# Patient Record
Sex: FEMALE | Race: WHITE | NOT HISPANIC OR LATINO | URBAN - METROPOLITAN AREA
[De-identification: names, ages, dates, MRNs, and addresses within clinical notes are randomized per-mention and may not be internally consistent; named-entity substitution may affect disease eponyms.]

---

## 2017-01-01 ENCOUNTER — COMMUNICATION - HEALTHEAST (OUTPATIENT)
Dept: INTERNAL MEDICINE | Facility: CLINIC | Age: 82
End: 2017-01-01

## 2017-01-01 ENCOUNTER — RECORDS - HEALTHEAST (OUTPATIENT)
Dept: ADMINISTRATIVE | Facility: OTHER | Age: 82
End: 2017-01-01

## 2017-01-01 ENCOUNTER — OFFICE VISIT - HEALTHEAST (OUTPATIENT)
Dept: INTERNAL MEDICINE | Facility: CLINIC | Age: 82
End: 2017-01-01

## 2017-01-01 ENCOUNTER — COMMUNICATION - HEALTHEAST (OUTPATIENT)
Dept: SCHEDULING | Facility: CLINIC | Age: 82
End: 2017-01-01

## 2017-01-01 ENCOUNTER — OFFICE VISIT - HEALTHEAST (OUTPATIENT)
Dept: FAMILY MEDICINE | Facility: CLINIC | Age: 82
End: 2017-01-01

## 2017-01-01 DIAGNOSIS — K58.9 IRRITABLE BOWEL SYNDROME WITHOUT DIARRHEA: ICD-10-CM

## 2017-01-01 DIAGNOSIS — R00.2 PALPITATION: ICD-10-CM

## 2017-01-01 DIAGNOSIS — I10 ESSENTIAL HYPERTENSION: ICD-10-CM

## 2017-01-01 DIAGNOSIS — B07.8 COMMON WART: ICD-10-CM

## 2017-01-01 DIAGNOSIS — G30.1 LATE ONSET ALZHEIMER'S DISEASE WITHOUT BEHAVIORAL DISTURBANCE (H): ICD-10-CM

## 2017-01-01 DIAGNOSIS — F02.80 LATE ONSET ALZHEIMER'S DISEASE WITHOUT BEHAVIORAL DISTURBANCE (H): ICD-10-CM

## 2017-01-01 DIAGNOSIS — B37.89 CANDIDA RASH OF GROIN: ICD-10-CM

## 2017-01-01 DIAGNOSIS — M19.90 OSTEOARTHRITIS: ICD-10-CM

## 2017-01-01 DIAGNOSIS — J06.9 URI (UPPER RESPIRATORY INFECTION): ICD-10-CM

## 2017-01-01 RX ORDER — GUAIFENESIN 600 MG/1
1200 TABLET, EXTENDED RELEASE ORAL 2 TIMES DAILY
Qty: 60 TABLET | Refills: 3 | Status: SHIPPED | OUTPATIENT
Start: 2017-01-01

## 2017-01-01 RX ORDER — ACETAMINOPHEN 500 MG
1000 TABLET ORAL 3 TIMES DAILY
Qty: 60 TABLET | Refills: 4 | Status: SHIPPED | COMMUNITY
Start: 2017-01-01

## 2017-01-01 RX ORDER — NYSTATIN 100000 U/G
CREAM TOPICAL
Qty: 30 G | Refills: 1 | Status: SHIPPED | OUTPATIENT
Start: 2017-01-01

## 2017-01-01 ASSESSMENT — MIFFLIN-ST. JEOR: SCORE: 1043.6

## 2017-01-18 ENCOUNTER — COMMUNICATION - HEALTHEAST (OUTPATIENT)
Dept: INTERNAL MEDICINE | Facility: CLINIC | Age: 82
End: 2017-01-18

## 2017-01-18 ENCOUNTER — RECORDS - HEALTHEAST (OUTPATIENT)
Dept: ADMINISTRATIVE | Facility: OTHER | Age: 82
End: 2017-01-18

## 2017-01-30 ENCOUNTER — RECORDS - HEALTHEAST (OUTPATIENT)
Dept: ADMINISTRATIVE | Facility: OTHER | Age: 82
End: 2017-01-30

## 2017-02-03 ENCOUNTER — COMMUNICATION - HEALTHEAST (OUTPATIENT)
Dept: INTERNAL MEDICINE | Facility: CLINIC | Age: 82
End: 2017-02-03

## 2018-01-01 ENCOUNTER — AMBULATORY - HEALTHEAST (OUTPATIENT)
Dept: GERIATRICS | Facility: CLINIC | Age: 83
End: 2018-01-01

## 2018-01-01 ENCOUNTER — COMMUNICATION - HEALTHEAST (OUTPATIENT)
Dept: INTERNAL MEDICINE | Facility: CLINIC | Age: 83
End: 2018-01-01

## 2018-01-01 ENCOUNTER — OFFICE VISIT - HEALTHEAST (OUTPATIENT)
Dept: GERIATRICS | Facility: CLINIC | Age: 83
End: 2018-01-01

## 2018-01-01 ENCOUNTER — RECORDS - HEALTHEAST (OUTPATIENT)
Dept: ADMINISTRATIVE | Facility: OTHER | Age: 83
End: 2018-01-01

## 2018-01-01 ENCOUNTER — AMBULATORY - HEALTHEAST (OUTPATIENT)
Dept: ADMINISTRATIVE | Facility: CLINIC | Age: 83
End: 2018-01-01

## 2018-01-01 DIAGNOSIS — H91.90 HEARING LOSS: ICD-10-CM

## 2018-01-01 DIAGNOSIS — K21.9 GASTROESOPHAGEAL REFLUX DISEASE WITHOUT ESOPHAGITIS: ICD-10-CM

## 2018-01-01 DIAGNOSIS — G47.33 OBSTRUCTIVE SLEEP APNEA: ICD-10-CM

## 2018-01-01 DIAGNOSIS — F32.A DEPRESSION: ICD-10-CM

## 2018-01-01 DIAGNOSIS — M81.0 OSTEOPOROSIS: ICD-10-CM

## 2018-01-01 DIAGNOSIS — G30.1 LATE ONSET ALZHEIMER'S DISEASE WITHOUT BEHAVIORAL DISTURBANCE (H): ICD-10-CM

## 2018-01-01 DIAGNOSIS — K58.9 IRRITABLE BOWEL SYNDROME WITHOUT DIARRHEA: ICD-10-CM

## 2018-01-01 DIAGNOSIS — F02.80 LATE ONSET ALZHEIMER'S DISEASE WITHOUT BEHAVIORAL DISTURBANCE (H): ICD-10-CM

## 2018-01-01 DIAGNOSIS — M19.91 PRIMARY OSTEOARTHRITIS: ICD-10-CM

## 2018-01-01 DIAGNOSIS — I10 ESSENTIAL HYPERTENSION: ICD-10-CM

## 2018-01-01 RX ORDER — FUROSEMIDE 20 MG
20 TABLET ORAL
Status: SHIPPED | COMMUNITY
Start: 2018-01-01

## 2018-01-01 RX ORDER — IBUPROFEN 400 MG/1
400 TABLET, FILM COATED ORAL EVERY 6 HOURS PRN
Status: SHIPPED | COMMUNITY
Start: 2018-01-01

## 2018-02-06 ENCOUNTER — AMBULATORY - HEALTHEAST (OUTPATIENT)
Dept: GERIATRICS | Facility: CLINIC | Age: 83
End: 2018-02-06

## 2021-05-29 ENCOUNTER — RECORDS - HEALTHEAST (OUTPATIENT)
Dept: ADMINISTRATIVE | Facility: CLINIC | Age: 86
End: 2021-05-29

## 2021-05-30 VITALS — WEIGHT: 152 LBS | HEIGHT: 63 IN | BODY MASS INDEX: 26.93 KG/M2

## 2021-05-31 VITALS — BODY MASS INDEX: 27.23 KG/M2 | WEIGHT: 153.7 LBS

## 2021-06-09 NOTE — PROGRESS NOTES
OFFICE VISIT NOTE    Subjective:   Chief Complaint:  Follow-up (has a lession on left hand bothering her)    89-year-old woman who is in today with a complaint of having a growth on her right hand that is irritated and bothering her.  She would like it removed.    Current Outpatient Prescriptions   Medication Sig     acetaminophen (TYLENOL EXTRA STRENGTH) 500 MG tablet Take 1,000 mg by mouth every 6 (six) hours as needed.     aspirin 81 MG EC tablet Take 1 tablet (81 mg total) by mouth daily.     bismuth subsalicylate (PEPTO BISMOL) 262 mg/15 mL suspension Take 15 mL by mouth every 6 (six) hours as needed for indigestion.     furosemide (LASIX) 20 MG tablet Take 1 tablet (20 mg total) by mouth daily.     guaiFENesin ER (MUCINEX) 600 mg 12 hr tablet Take 2 tablets (1,200 mg total) by mouth 2 (two) times a day.     ibuprofen (MOTRIN IB) 200 MG tablet Take 2 tablets (400 mg total) by mouth every 6 (six) hours as needed for pain.     mirtazapine (REMERON) 15 MG tablet Take 15 mg by mouth bedtime.     multivitamin therapeutic (THERAGRAN) tablet Take 1 tablet by mouth daily.     omeprazole (PRILOSEC) 20 MG capsule Take 20 mg by mouth daily.     propranolol (INDERAL) 10 MG tablet Take 10 mg by mouth 2 (two) times a day.     ranitidine (ZANTAC) 150 MG tablet Take 150 mg by mouth every morning.      simvastatin (ZOCOR) 40 MG tablet Take 1 tablet (40 mg total) by mouth daily. (Patient taking differently: Take 40 mg by mouth bedtime. )       Review of Systems:  A comprehensive review of systems is negative except for the comments above    Objective:    Pulse 67  SpO2 98%  GENERAL: No acute distress.  She has a 6 mm diameter common wart on the top of the right hand.  No signs of infection.  No other significant growths on the hand.  She has significant dementia.  She is pleasant and talkative.  She does have a poor memory.  Blood pressure today is 138/68.  Pulse 76.    Assessment & Plan   Huma Wells is a 89 y.o.  female.    Common wart involving top of the right hand.  I remove this wart using 1/2 cc of Xylocaine with epinephrine.  I then curetted the wart off with a sharp curette and cauterized the base.  Bandage can be removed in 2 days.    Diagnoses and all orders for this visit:    Common wart    Late onset Alzheimer's disease without behavioral disturbance        Eyal To MD  Transcription using voice recognition software, may contain typographical errors.

## 2021-06-09 NOTE — PROGRESS NOTES
"  Office Visit - Follow Up   Huma Wells   89 y.o. female    Date of Visit: 3/14/2017    Chief Complaint   Patient presents with     Nasal Congestion     also some chest congestion     Hoarse     voie raspy        Assessment and Plan   1. URI (upper respiratory infection)  Overall the symptoms are clearing on their own.  She has no clinical evidence for pneumonia or even acute bronchitis.  No antibiotics are felt indicated.  She will continue use Mucinex as needed and push fluids.  Huma and her son are comfortable with this plan of care    2. Essential hypertension  Blood pressure quite good at 120/58.          No Follow-up on file.     History of Present Illness   This 89 y.o. old brought in by her son with a one-month history of nasal congestion.  She initially had quite a raspy voice and this has gradually improved.  She denies any fever.  Several nights ago she felt dizzy especially on arising she push fluids and she is okay now.  With the dizziness and ongoing symptoms it was recommended she come see the doctor.    Review of Systems: A comprehensive review of systems was negative except as noted.     Medications, Allergies and Problem List   Reviewed and updated     Physical Exam   General Appearance:       Visit Vitals     /58 (Patient Site: Right Arm, Patient Position: Sitting)     Pulse 87     Temp 98.3  F (36.8  C) (Oral)     Ht 5' 3\" (1.6 m)     Wt 152 lb (68.9 kg)     BMI 26.93 kg/m2       Lungs are entirely clear.  Blood pressure is good.  Heart rhythm is regular.  Neck shows no adenopathy.  Mouth and throat are negative.  Her voice does seem relatively normal to me.  Tympanic membranes are clear.       Additional Information   Current Outpatient Prescriptions   Medication Sig Dispense Refill     acetaminophen (TYLENOL EXTRA STRENGTH) 500 MG tablet Take 1,000 mg by mouth every 6 (six) hours as needed.       furosemide (LASIX) 20 MG tablet Take 1 tablet (20 mg total) by mouth daily. 30 " tablet 11     multivitamin therapeutic (THERAGRAN) tablet Take 1 tablet by mouth daily.       omeprazole (PRILOSEC) 20 MG capsule Take 20 mg by mouth daily.       propranolol (INDERAL) 10 MG tablet Take 10 mg by mouth 2 (two) times a day.       ranitidine (ZANTAC) 150 MG tablet Take 150 mg by mouth every morning.        simvastatin (ZOCOR) 40 MG tablet Take 1 tablet (40 mg total) by mouth daily. (Patient taking differently: Take 40 mg by mouth bedtime. ) 90 tablet 3     aspirin 81 MG EC tablet Take 1 tablet (81 mg total) by mouth daily.  0     bismuth subsalicylate (PEPTO BISMOL) 262 mg/15 mL suspension Take 15 mL by mouth every 6 (six) hours as needed for indigestion.       guaiFENesin ER (MUCINEX) 600 mg 12 hr tablet Take 2 tablets (1,200 mg total) by mouth 2 (two) times a day. 60 tablet 3     ibuprofen (MOTRIN IB) 200 MG tablet Take 2 tablets (400 mg total) by mouth every 6 (six) hours as needed for pain. 50 tablet 0     mirtazapine (REMERON) 15 MG tablet Take 15 mg by mouth bedtime.       No current facility-administered medications for this visit.      Allergies   Allergen Reactions     Amoxicillin Unknown     Psyllium Unknown     Social History   Substance Use Topics     Smoking status: Former Smoker     Smokeless tobacco: Never Used     Alcohol use No      Comment: very rare       Review and/or order of clinical lab tests:  Review and/or order of radiology tests:  Review and/or order of medicine tests:  Discussion of test results with performing physician:  Decision to obtain old records and/or obtain history from someone other than the patient:  Review and summarization of old records and/or obtaining history from someone other than the patient and.or discussion of case with another health care provider:  Independent visualization of image, tracing or specimen itself:    Time: total time spent with the patient was 15 minutes of which >50% was spent in counseling and coordination of care     Fernando WADE  MD Beck

## 2021-06-11 NOTE — PROGRESS NOTES
"Chief Complaint   Patient presents with     Skin Problem     \"Groin ' area, red, not itching. Started last night        History of Present Illness: Nursing notes reviewed. Patient states she noted a rash initially in right lower pelvic region in skin fold area between abdomen and left upper thigh. No associated pain or itching. She noted a smaller similar rash in the same area on right side today.     Review of systems: See history of present illness, otherwise negative.     Current Outpatient Prescriptions   Medication Sig Dispense Refill     mirtazapine (REMERON) 15 MG tablet Take 15 mg by mouth bedtime.       multivitamin therapeutic (THERAGRAN) tablet Take 1 tablet by mouth daily.       omeprazole (PRILOSEC) 20 MG capsule Take 20 mg by mouth daily.       propranolol (INDERAL) 10 MG tablet Take 10 mg by mouth 2 (two) times a day.       simvastatin (ZOCOR) 40 MG tablet Take 1 tablet (40 mg total) by mouth daily. (Patient taking differently: Take 40 mg by mouth bedtime. ) 90 tablet 3     acetaminophen (TYLENOL EXTRA STRENGTH) 500 MG tablet Take 2 tablets (1,000 mg total) by mouth 3 (three) times a day. 60 tablet 4     aspirin 81 MG EC tablet Take 1 tablet (81 mg total) by mouth daily.  0     bismuth subsalicylate (PEPTO BISMOL) 262 mg/15 mL suspension Take 15 mL by mouth every 6 (six) hours as needed for indigestion.       furosemide (LASIX) 20 MG tablet Take 1 tablet (20 mg total) by mouth daily. 30 tablet 11     guaiFENesin ER (MUCINEX) 600 mg 12 hr tablet Take 2 tablets (1,200 mg total) by mouth 2 (two) times a day. 60 tablet 3     ibuprofen (MOTRIN IB) 200 MG tablet Take 2 tablets (400 mg total) by mouth every 6 (six) hours as needed for pain. 50 tablet 0     nystatin (MYCOSTATIN) cream Apply to rash in groin twice daily for 2 days until rash is gone, or up to 10 days. 30 g 1     ranitidine (ZANTAC) 150 MG tablet Take 150 mg by mouth every morning.        No current facility-administered medications for this " visit.        Past Medical History:   Diagnosis Date     Dementia 2006     Depression      GERD with stricture      HTN (hypertension)      Hyperlipidemia      KIRA (obstructive sleep apnea)      Osteoarthritis       Past Surgical History:   Procedure Laterality Date     TONSILECTOMY, ADENOIDECTOMY, BILATERAL MYRINGOTOMY AND TUBES       TOTAL KNEE ARTHROPLASTY Bilateral 1996/2006      Social History     Social History     Marital status:      Spouse name: N/A     Number of children: N/A     Years of education: N/A     Social History Main Topics     Smoking status: Former Smoker     Smokeless tobacco: Never Used     Alcohol use No      Comment: very rare     Drug use: No     Sexual activity: Not Asked     Other Topics Concern     None     Social History Narrative     sine 2004, has supportive children       History   Smoking Status     Former Smoker   Smokeless Tobacco     Never Used      Exam:   Blood pressure 134/60, pulse 70, temperature 98.7  F (37.1  C), temperature source Oral, resp. rate 12, weight 153 lb 11.2 oz (69.7 kg), SpO2 98 %.    EXAM:   General: Vital signs reviewed. Patient is in no acute appearing distress and very pleasant. Breathing is non labored appearing. Patient is alert and oriented x 3.   There is a confluent erythematous rash in left lower pelvic region in area noted in HPI, in most of skin contact area. There is a similar smaller area on right side. No vesicles or scabs noted.    Assessment/Plan   1. Candida rash of groin  nystatin (MYCOSTATIN) cream       Patient Instructions   Try to keep rash area dry if able. If rash is not improved in 10 days, or worsens with treatment, be seen again.     Darin Carroll, DO

## 2021-06-14 NOTE — PROGRESS NOTES
OFFICE VISIT NOTE    Subjective:   Chief Complaint:  Follow-up (heart)    89-year-old woman in for follow-up regarding Alzheimer's disease, osteoarthritis, irritable bowel syndrome.  She is doing about the same.  She complains of occasional palpitations.  No chest pain or shortness of breath no near syncope.  She is in a care facility at this time.  She ambulates with the use of a walker but really does not move around too much.    Current Outpatient Prescriptions   Medication Sig     acetaminophen (TYLENOL EXTRA STRENGTH) 500 MG tablet Take 2 tablets (1,000 mg total) by mouth 3 (three) times a day.     aspirin 81 MG EC tablet Take 1 tablet (81 mg total) by mouth daily.     bismuth subsalicylate (PEPTO BISMOL) 262 mg/15 mL suspension Take 15 mL by mouth every 6 (six) hours as needed for indigestion.     furosemide (LASIX) 20 MG tablet Take 1 tablet (20 mg total) by mouth daily.     guaiFENesin ER (MUCINEX) 600 mg 12 hr tablet Take 2 tablets (1,200 mg total) by mouth 2 (two) times a day.     mirtazapine (REMERON) 15 MG tablet Take 15 mg by mouth bedtime.     multivitamin therapeutic (THERAGRAN) tablet Take 1 tablet by mouth daily.     nystatin (MYCOSTATIN) cream Apply to rash in groin twice daily for 2 days until rash is gone, or up to 10 days.     omeprazole (PRILOSEC) 20 MG capsule Take 20 mg by mouth daily.     propranolol (INDERAL) 10 MG tablet Take 10 mg by mouth 2 (two) times a day.     ranitidine (ZANTAC) 150 MG tablet Take 150 mg by mouth every morning.      simvastatin (ZOCOR) 40 MG tablet Take 1 tablet (40 mg total) by mouth daily. (Patient taking differently: Take 40 mg by mouth bedtime. )       PSFHx: Tobacco Status:  She  reports that she has quit smoking. She has never used smokeless tobacco.    Review of Systems:  A comprehensive review of systems is negative except for the comments above    Objective:    There were no vitals taken for this visit.  GENERAL: No acute distress.  Pleasant but obviously  demented.  Blood pressures 132/56.  Pulse 72.  Oxygen saturations 98%.  Osteoarthritic changes of the fingers as well as knees.  Lungs are clear.  Heart shows a sinus rhythm.  I heard perhaps one premature atrial beats the entire time I listen to her.  There is no evidence of any tachyarrhythmias or bradycardia.  No JVD.  Trace edema of the pretibial region.  He wears hearing aids.  She still somewhat hard of hearing.  No carotid bruits.    Assessment & Plan   Huma Wells is a 89 y.o. female.    She is  stable.  I think the palpitations that she is experiencing are most likely premature beats.  I see  today no evidence of atrial fibrillation etc.  She is already on propranolol.  I would not add any new medicines here.  Otherwise her medical problems seem fairly stable.  She will continue her usual meds.  I discussed the situation with the patient as well as her son.  25 minutes was spent with this patient most of which was counseling and coordination of her care in the nursing home.    Diagnoses and all orders for this visit:    Late onset Alzheimer's disease without behavioral disturbance    Irritable bowel syndrome without diarrhea    Osteoarthritis    Palpitation            Eyal To MD  Transcription using voice recognition software, may contain typographical errors.

## 2021-06-15 NOTE — PROGRESS NOTES
[facility]      Facility:    [facility] [[id number]]    Code Status: DNR/DNI      Chief Complaint/Reason for Visit:  Chief Complaint   Patient presents with     H & P     admission to LTC       HPI:   Huma is a 89 y.o.  female who has significant dementia.  She had been living at [facility], and transfers to [facility] for long-term care.  She has Alzheimer's disease, hypertension, primary osteoarthritis, osteoporosis, obstructive sleep apnea, GERD without esophagitis, chronic kidney disease, depression, tooth this repair.    Review of staff intake information:  She cannot operate a walker, does wander some.  She has significant hip pain and needs help getting in and out of bed has had some weight loss back in 2016 but stable more recently, sometimes incontinent, isolates herself socially needs assistance with dressing grooming bathing, extensive assistance with transferring.  She does have obstructive sleep apnea but is intolerant of CPAP, has intermittent confusion.  In the past she had paranoia and was more withdrawn, but continues with limited social interactions.  Bilateral hip beneath arthritic pain limit her mobility, balance is off    Past Medical History:  Past Medical History:   Diagnosis Date     Alzheimer's dementia      Anemia      CKD (chronic kidney disease)      Dementia 2006     Depression      Edema      GERD with stricture      HTN (hypertension)      Hyperlipidemia      IBS (irritable bowel syndrome)      Leukocytosis      KIRA (obstructive sleep apnea)      Osteoarthritis      Palpitations            Surgical History:  Past Surgical History:   Procedure Laterality Date     TONSILECTOMY, ADENOIDECTOMY, BILATERAL MYRINGOTOMY AND TUBES       TOTAL KNEE ARTHROPLASTY Bilateral 1996/2006       Family History:   No family history on file.    Social History:    Social History     Social History      Marital status:      Spouse name: N/A     Number of children: N/A     Years of education: N/A     Social History Main Topics     Smoking status: Former Smoker     Smokeless tobacco: Never Used     Alcohol use No      Comment: very rare     Drug use: No     Sexual activity: Not on file     Other Topics Concern     Not on file     Social History Narrative     maria g 2004, has supportive children          Review of Systems    Vitals:    01/30/18 0900   BP: 130/65   Pulse: 70   Resp: 16   Temp: (!) 96  F (35.6  C)   SpO2: 96%       Physical Exam   Constitutional: She appears well-nourished. No distress.   Pleasant, short-term memory deficits obvious   HENT:   Nose: Nose normal.   Mouth/Throat: Oropharynx is clear and moist.   Bilateral hearing aid  Missing right upper and both lower central incisors   Eyes: Conjunctivae and EOM are normal.   Cardiovascular: Regular rhythm and normal heart sounds.    No murmur heard.  Pulmonary/Chest: Breath sounds normal. She has no wheezes. She has no rales.   Abdominal: Soft. Bowel sounds are normal. She exhibits no distension. There is no tenderness.   Musculoskeletal: She exhibits tenderness.   Trace edema  3+ lower extremity strength, knee stiffness  4+ upper extremitystrength   Lymphadenopathy:     She has no cervical adenopathy.   Neurological: She is alert. She exhibits normal muscle tone. Coordination normal.   Skin: Skin is warm and dry. No rash noted.   Psychiatric: She has a normal mood and affect.       Medication List:  Current Outpatient Prescriptions   Medication Sig     acetaminophen (TYLENOL EXTRA STRENGTH) 500 MG tablet Take 2 tablets (1,000 mg total) by mouth 3 (three) times a day.     bismuth subsalicylate (PEPTO-BISMOL) 262 mg Tab Take 1 tablet by mouth as needed.     furosemide (LASIX) 20 MG tablet Take 20 mg by mouth 4 (four) times a week. On Sun, Mon, Wed, Fri     guaiFENesin ER (MUCINEX) 600 mg 12 hr tablet Take 2 tablets (1,200 mg total) by mouth 2  (two) times a day. (Patient taking differently: Take 1,200 mg by mouth 2 (two) times a day as needed for congestion. )     ibuprofen (ADVIL,MOTRIN) 400 MG tablet Take 400 mg by mouth every 6 (six) hours as needed for pain.     mirtazapine (REMERON) 15 MG tablet Take 30 mg by mouth at bedtime.      multivitamin therapeutic (THERAGRAN) tablet Take 1 tablet by mouth daily.     nystatin (MYCOSTATIN) cream Apply to rash in groin twice daily for 2 days until rash is gone, or up to 10 days. (Patient taking differently: Apply 1 application topically 2 (two) times a day. )     omeprazole (PRILOSEC) 20 MG capsule Take 20 mg by mouth daily.     propranolol (INDERAL) 10 MG tablet Take 10 mg by mouth 2 (two) times a day.       Labs: Last labs over 1 year ago     Ref Range & Units 12/19/16  1:48 PM     Sodium 136 - 145 mmol/L 144   Potassium 3.5 - 5.1 mmol/L 4.1   Chloride 98 - 107 mmol/L 107   CO2 21 - 32 mmol/L 30   Anion Gap, Calculation 5 - 18 mmol/L 7   Glucose 74 - 125 mg/dL 108   Calcium 8.5 - 10.1 mg/dL 9.0   BUN 8 - 28 mg/dL 19   Creatinine 0.60 - 1.10 mg/dL 1.41 (H)   GFR MDRD Non Af Amer >60 mL/min/1.73m2 35 (L)               Assessment:    ICD-10-CM    1. Late onset Alzheimer's disease without behavioral disturbance G30.1     F02.80    2. Essential hypertension I10    3. Irritable bowel syndrome without diarrhea K58.9    4. Primary osteoarthritis knees and hips M19.91    5. Gastroesophageal reflux disease without esophagitis K21.9    6. Osteoporosis M81.0    7. Depression F32.9    8. Hearing loss H91.90    9. Obstructive sleep apnea: intolerant of CPAP G47.33          Plan:  Appears comfortable  Continue current medications and care plan.      Electronically signed by: Estefani Pimentel MD

## 2021-06-16 PROBLEM — M19.91 PRIMARY OSTEOARTHRITIS: Status: ACTIVE | Noted: 2018-01-01
